# Patient Record
Sex: MALE | Race: ASIAN | NOT HISPANIC OR LATINO | Employment: STUDENT | URBAN - METROPOLITAN AREA
[De-identification: names, ages, dates, MRNs, and addresses within clinical notes are randomized per-mention and may not be internally consistent; named-entity substitution may affect disease eponyms.]

---

## 2022-06-16 ENCOUNTER — LAB (OUTPATIENT)
Dept: LAB | Facility: CLINIC | Age: 32
End: 2022-06-16
Payer: COMMERCIAL

## 2022-06-16 DIAGNOSIS — Z13.71 TESTING OF MALE FOR GENETIC DISEASE CARRIER STATUS: ICD-10-CM

## 2022-06-16 DIAGNOSIS — Z31.5 ENCOUNTER FOR PROCREATIVE GENETIC COUNSELING AND TESTING: ICD-10-CM

## 2022-06-16 DIAGNOSIS — Z31.448 ENCOUNTER FOR OTHER GENETIC TESTING OF MALE FOR PROCREATIVE MANAGEMENT: ICD-10-CM

## 2022-06-16 DIAGNOSIS — Z31.5 ENCOUNTER FOR PROCREATIVE GENETIC COUNSELING AND TESTING: Primary | ICD-10-CM

## 2022-06-16 PROCEDURE — 36415 COLL VENOUS BLD VENIPUNCTURE: CPT

## 2022-06-16 NOTE — PROGRESS NOTES
June 16, 2022     Janeen Waldron accompanied his wife to her Maternal Fetal Medicine (MFM) genetic counseling appointment today as part of her pregnancy care with our team. During today's consult, Janeen Waldron and his partner each elected to proceed with expanded carrier screening via [employer] to better understand reproductive risks to the current pregnancy and any possible future pregnancies. Specifically, after a review of the benefits, risks, utility, and limitations of carrier screening, the patient consented to undergoing the comprehensive carrier screening panel via [employer] (includes 289 conditions for males).     The patient and his partner are of [place] ancestry:      Expanded carrier screening for mutations in a large panel of genes associated with autosomal recessive conditions including cystic fibrosis, Thalassemias, hearing loss, spinal muscular atrophy, and others, is now available. We also reviewed that expanded carrier screening can assess for common X-linked recessive disorders such as Fragile X syndrome.       We discussed that expanded carrier screening is designed to identify carrier status for conditions that are primarily childhood or adolescent onset. Expanded carrier screening does not evaluate for adult-onset conditions such as hereditary cancer syndromes, Erica disease, dementia/ Alzheimer's disease, or cardiovascular disease risk factors. Additionally, expanded carrier screening is not comprehensive for all known genetic diseases or inherited conditions. This is a screening test, and residual carrier status risk figures will be provided to the patient after results become available. Carrier screening is not meant to diagnose the patient with a condition, and generally carriers are asymptomatic. However, certain genes may confer increased risks for various health concerns in carriers (for example, but not limited to: JULIO CESAR, FMR1, DMD, etc). Patients are encouraged to share  results with their primary care providers to ensure appropriate screening. If we are notified by the performing laboratory of a variant reclassification, the patient will be contacted.      We reviewed availability of comprehensive carrier screening through [facility] for up to 289 conditions. [facility] can also screen for fewer conditions via a variety of panels. [facility] laboratory will report on pseudodeficiency alleles, which are benign variants that are not known to be associated with disease and are not thought to impact the individuals risk to be a carrier for these conditions.  However, the presence of pseudodeficiency alleles can exhibit false positive results on biochemical tests such as  screen. This will be addressed with the patient should a pseudodeficiency allele be identified in his sample sample. [facility] laboratory will report the common 5T CFTR allele in isolation.     Janeen Waldron and his wife provided permission for [facility] genetic counseling to call either of them with the couple's combined results once available in 2-3 weeks from today. If one if unavailable, rather than leaving a voicemail, they requested we call the other person to review results. If the other partner is not available, a callback number will be left on voicemail. Consent to communicate forms were completed in-person and are scanned into the couple's respective charts. All of Janeen León's and his partner's questions were addressed to their apparent satisfaction today.       Rosalba Gagnon MS, [facility]  Genetic Counselor  [facility]  Maternal Fetal Medicine  Ph: [phone]   Pager: [phone]

## 2022-07-18 LAB — SCANNED LAB RESULT: NORMAL

## 2022-07-19 ENCOUNTER — DOCUMENTATION ONLY (OUTPATIENT)
Dept: MATERNAL FETAL MEDICINE | Facility: CLINIC | Age: 32
End: 2022-07-19

## 2022-07-19 NOTE — PROGRESS NOTES
2022     I called the patient's wife (Yulia Yung), per the couple's request during their initiall genetic counseling consult, to discuss her and her 's combined [facility] genetics carrier screening results. 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 They do not carry the same conditions, therefore, the couple's reproductive risks for having a child with any one of the conditions screened for on this panel have been significantly reduced (<0.01% chance for all conditions). Please see a copy of Yulia's and Janeen León's results under the Laboratory tab for a detailed review of the conditions they are carriers for.     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 It is noted that individuals with a single disease-causing CFTR variant (heterozygous carriers) may have an approximately 4-10 fold increased risk for chronic pancreatitis, although the absolute risk of pancreatitis remains low (less than 1 in 100). Hereditary pancreatitis is characterized by recurrent episodes of acute inflammation of the pancreas (pancreatitis) beginning in childhood or adolescence, leading to chronic pancreatitis. Chronic pancreatitis is a risk factor for pancreatic cancer. Due to this potential increased risk for chronic pancreatitis, heterozygous carriers may consider follow-up with a medical provider. Yulia verbalized understanding and does not have any questions or concerns regarding this finding.      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 Individuals with a pseudodeficiency allele(s) may exhibit false positive results on related biochemical tests, such as  screening. However,  pseudodeficiency alleles are not known to cause disease, including ARSA-related metachromatic leukodystrophy, Krabbe disease, or Pompee disease. Yulia verbalized understanding and had no questions today about these findings.     No other genetic testing is pending for the couple at this time. Yulia will review these results in more detail with her  and will call me directly should she have any questions regarding their combined results.         Rosalba Gagnon MS, [facility]  Genetic Counselor  [facility]  Maternal Fetal Medicine  Ph: [phone]

## 2024-07-10 ENCOUNTER — ANCILLARY PROCEDURE (OUTPATIENT)
Dept: ULTRASOUND IMAGING | Facility: CLINIC | Age: 34
End: 2024-07-10
Attending: FAMILY MEDICINE
Payer: COMMERCIAL

## 2024-07-10 DIAGNOSIS — R74.8 ELEVATED LIVER ENZYMES: ICD-10-CM

## 2024-07-10 DIAGNOSIS — E78.5 HYPERLIPIDEMIA: ICD-10-CM

## 2024-07-10 LAB — RADIOLOGIST FLAGS: NORMAL

## 2024-07-10 PROCEDURE — 76705 ECHO EXAM OF ABDOMEN: CPT | Mod: GC | Performed by: RADIOLOGY

## 2024-08-31 ENCOUNTER — ANCILLARY PROCEDURE (OUTPATIENT)
Dept: MRI IMAGING | Facility: CLINIC | Age: 34
End: 2024-08-31
Attending: FAMILY MEDICINE
Payer: COMMERCIAL

## 2024-08-31 DIAGNOSIS — K76.0 FATTY LIVER DISEASE, NONALCOHOLIC: ICD-10-CM

## 2024-08-31 DIAGNOSIS — E78.5 HYPERLIPIDEMIA, UNSPECIFIED HYPERLIPIDEMIA TYPE: ICD-10-CM

## 2024-08-31 DIAGNOSIS — R74.8 ELEVATED LIVER ENZYMES: ICD-10-CM

## 2024-08-31 DIAGNOSIS — K76.9 LIVER LESION, RIGHT LOBE: ICD-10-CM

## 2024-08-31 PROCEDURE — A9585 GADOBUTROL INJECTION: HCPCS | Performed by: STUDENT IN AN ORGANIZED HEALTH CARE EDUCATION/TRAINING PROGRAM

## 2024-08-31 PROCEDURE — 74183 MRI ABD W/O CNTR FLWD CNTR: CPT | Performed by: STUDENT IN AN ORGANIZED HEALTH CARE EDUCATION/TRAINING PROGRAM

## 2024-08-31 RX ORDER — GADOBUTROL 604.72 MG/ML
10 INJECTION INTRAVENOUS ONCE
Status: COMPLETED | OUTPATIENT
Start: 2024-08-31 | End: 2024-08-31

## 2024-08-31 RX ADMIN — GADOBUTROL 9 ML: 604.72 INJECTION INTRAVENOUS at 07:42

## 2024-10-06 ENCOUNTER — HEALTH MAINTENANCE LETTER (OUTPATIENT)
Age: 34
End: 2024-10-06